# Patient Record
Sex: MALE | Race: OTHER | HISPANIC OR LATINO | Employment: UNEMPLOYED | URBAN - NONMETROPOLITAN AREA
[De-identification: names, ages, dates, MRNs, and addresses within clinical notes are randomized per-mention and may not be internally consistent; named-entity substitution may affect disease eponyms.]

---

## 2024-04-15 ENCOUNTER — OFFICE VISIT (OUTPATIENT)
Dept: URGENT CARE | Facility: CLINIC | Age: 39
End: 2024-04-15
Payer: MEDICAID

## 2024-04-15 VITALS
DIASTOLIC BLOOD PRESSURE: 84 MMHG | OXYGEN SATURATION: 98 % | RESPIRATION RATE: 16 BRPM | BODY MASS INDEX: 35.55 KG/M2 | WEIGHT: 240 LBS | TEMPERATURE: 98.2 F | HEART RATE: 76 BPM | HEIGHT: 69 IN | SYSTOLIC BLOOD PRESSURE: 122 MMHG

## 2024-04-15 DIAGNOSIS — J45.41 MODERATE PERSISTENT ASTHMA WITH EXACERBATION: ICD-10-CM

## 2024-04-15 PROCEDURE — 3079F DIAST BP 80-89 MM HG: CPT | Performed by: FAMILY MEDICINE

## 2024-04-15 PROCEDURE — 3074F SYST BP LT 130 MM HG: CPT | Performed by: FAMILY MEDICINE

## 2024-04-15 PROCEDURE — 99214 OFFICE O/P EST MOD 30 MIN: CPT | Performed by: FAMILY MEDICINE

## 2024-04-15 RX ORDER — ALBUTEROL SULFATE 0.63 MG/3ML
0.63 SOLUTION RESPIRATORY (INHALATION) EVERY 4 HOURS PRN
Qty: 150 ML | Refills: 0 | Status: SHIPPED | OUTPATIENT
Start: 2024-04-15

## 2024-04-15 RX ORDER — ALBUTEROL SULFATE 90 UG/1
2 AEROSOL, METERED RESPIRATORY (INHALATION) EVERY 6 HOURS PRN
Qty: 1 EACH | Refills: 0 | Status: SHIPPED | OUTPATIENT
Start: 2024-04-15

## 2024-04-15 NOTE — PROGRESS NOTES
"Subjective:     Chief Complaint   Patient presents with    Sore Throat     Patient coming for sore throat, fever and body aches x 7 days              Asthma  Pt complains of cough x 7 d. There is no sputum production. The problem occurs constantly. The problem has been unchanged.       Associated symptoms include:   sore throat, body aches,  dyspnea on exertion. Pertinent negatives include no chest pain, fever, nasal congestion, orthopnea, PND, rhinorrhea      Pt's symptoms are aggravated by exercise.     Pt's symptoms are not alleviated by beta-agonist.     Pt's past medical history is significant for asthma.     No past medical history on file.    Social History     Tobacco Use    Smoking status: Never    Smokeless tobacco: Never   Substance Use Topics    Alcohol use: Never    Drug use: Never       No family history on file.    Review of Systems   Constitutional: Negative for fever.   HENT: Negative for rhinorrhea and sore throat.    Respiratory: Positive for cough and shortness of breath. Negative for sputum production.    Cardiovascular: Positive for dyspnea on exertion. Negative for chest pain, palpitations and PND.   All other systems reviewed and are negative.         Objective:     /84   Pulse 76   Temp 36.8 °C (98.2 °F) (Temporal)   Resp 16   Ht 1.753 m (5' 9\")   Wt 109 kg (240 lb)   SpO2 98%       Physical Exam   Constitutional: pt is oriented to person, place, and time. Pt appears well-developed. No distress.   HENT:   Head: Normocephalic and atraumatic.   Right Ear: External ear normal.   Left Ear: External ear normal.   Mouth/Throat: No oropharyngeal exudate.   Eyes: Conjunctivae and EOM are normal. Pupils are equal, round, and reactive to light. No scleral icterus.   Neck: Neck supple. No JVD present.   Cardiovascular: Normal rate, regular rhythm and normal heart sounds.    Pulmonary/Chest: Effort normal. No respiratory distress. Pt has wheezes.   no rales.   Lymphadenopathy:        no " cervical adenopathy.   Neurological:   alert and oriented to person, place, and time.   Skin: Skin is warm and not diaphoretic. No erythema.   Psychiatric: pt behavior is normal.   Nursing note and vitals reviewed.              Assessment/Plan:        1. Moderate persistent asthma with exacerbation   Pt refused oral steroid course  - albuterol 108 (90 Base) MCG/ACT Aero Soln inhalation aerosol; Inhale 2 Puffs every 6 hours as needed for Shortness of Breath.  Dispense: 1 Each; Refill: 0  - albuterol (ACCUNEB) 0.63 MG/3ML nebulizer solution; Take 3 mL by nebulization every four hours as needed for Shortness of Breath.  Dispense: 150 mL; Refill: 0    2. Sore throat  Likely secondary to #1  Pt refused rapid strep test    rx motrin 800mg tid prn      Differential diagnosis, natural history, supportive care, and indications for immediate follow-up discussed. All questions answered. Patient agrees with the plan of care.     Follow-up as needed if symptoms worsen or fail to improve to PCP, Urgent care or Emergency Room.     I have personally reviewed prior external notes and test results pertinent to today's visit.  I have independently reviewed and interpreted all diagnostics ordered during this urgent care acute visit.

## 2024-04-15 NOTE — LETTER
April 15, 2024         Patient: Garth Beverly   YOB: 1985   Date of Visit: 4/15/2024           To Whom it May Concern:    Garth Beverly was seen in my clinic on 4/15/2024. He may return to work on Wednesday.    If you have any questions or concerns, please don't hesitate to call.        Sincerely,           Erasmo Ibarra M.D.  Electronically Signed

## 2025-04-01 ENCOUNTER — OFFICE VISIT (OUTPATIENT)
Dept: URGENT CARE | Facility: CLINIC | Age: 40
End: 2025-04-01
Payer: MEDICAID

## 2025-04-01 VITALS
TEMPERATURE: 97.9 F | BODY MASS INDEX: 36.29 KG/M2 | SYSTOLIC BLOOD PRESSURE: 132 MMHG | OXYGEN SATURATION: 98 % | HEIGHT: 69 IN | RESPIRATION RATE: 16 BRPM | DIASTOLIC BLOOD PRESSURE: 80 MMHG | WEIGHT: 245 LBS | HEART RATE: 60 BPM

## 2025-04-01 DIAGNOSIS — K02.9 DENTAL CARIES: ICD-10-CM

## 2025-04-01 DIAGNOSIS — K08.89 PAIN, DENTAL: ICD-10-CM

## 2025-04-01 PROCEDURE — 3079F DIAST BP 80-89 MM HG: CPT | Performed by: NURSE PRACTITIONER

## 2025-04-01 PROCEDURE — 99213 OFFICE O/P EST LOW 20 MIN: CPT | Performed by: NURSE PRACTITIONER

## 2025-04-01 PROCEDURE — 3075F SYST BP GE 130 - 139MM HG: CPT | Performed by: NURSE PRACTITIONER

## 2025-04-01 RX ORDER — IBUPROFEN 800 MG/1
800 TABLET, FILM COATED ORAL EVERY 8 HOURS PRN
Qty: 30 TABLET | Refills: 0 | Status: SHIPPED | OUTPATIENT
Start: 2025-04-01 | End: 2025-04-22

## 2025-04-01 ASSESSMENT — ENCOUNTER SYMPTOMS
SORE THROAT: 0
FEVER: 0
SHORTNESS OF BREATH: 0
COUGH: 0

## 2025-04-01 NOTE — LETTER
April 1, 2025         Patient: Garth Beverly   YOB: 1985   Date of Visit: 4/1/2025           To Whom it May Concern:    Garth Beverly was seen in my clinic on 4/1/2025. He may return to work on 4/2/2025.    If you have any questions or concerns, please don't hesitate to call.        Sincerely,           MARIUSZ James.  Electronically Signed

## 2025-04-01 NOTE — PROGRESS NOTES
"  Subjective:     Garth Beverly is a 40 y.o. male who presents for Otalgia (Patient coming in for mouth abscess, patient stated he has a tooth pulled out months ago )      Reports mouth pain after eating a chip. Had had a tooth removed, and has had intermittent pain since with eating. Reports new onset pain to tooth proximal to extracted tooth. Has been using Peroxide mouthrinse with some relief.     Dental Pain   This is a recurrent problem. The current episode started in the past 7 days. The pain is at a severity of 5/10. Pertinent negatives include no difficulty swallowing or fever. He has tried NSAIDs for the symptoms.       History reviewed. No pertinent past medical history.    History reviewed. No pertinent surgical history.    Social History     Socioeconomic History    Marital status: Single     Spouse name: Not on file    Number of children: Not on file    Years of education: Not on file    Highest education level: Not on file   Occupational History    Not on file   Tobacco Use    Smoking status: Never    Smokeless tobacco: Never   Substance and Sexual Activity    Alcohol use: Never    Drug use: Never    Sexual activity: Not on file   Other Topics Concern    Not on file   Social History Narrative    Not on file     Social Drivers of Health     Financial Resource Strain: Not on file   Food Insecurity: Not on file   Transportation Needs: Not on file   Physical Activity: Not on file   Stress: Not on file   Social Connections: Not on file   Intimate Partner Violence: Not on file   Housing Stability: Not on file        History reviewed. No pertinent family history.     No Known Allergies    Review of Systems   Constitutional:  Negative for fever.   HENT:  Negative for ear pain and sore throat.    Respiratory:  Negative for cough and shortness of breath.    All other systems reviewed and are negative.       Objective:   /80   Pulse 60   Temp 36.6 °C (97.9 °F)   Resp 16   Ht 1.753 m (5' 9\")   Wt 111 kg " (245 lb)   SpO2 98%   BMI 36.18 kg/m²     Physical Exam  HENT:      Mouth/Throat:      Lips: Pink.      Mouth: Mucous membranes are moist.      Dentition: Dental tenderness and dental caries present.      Pharynx: Oropharynx is clear. No oropharyngeal exudate.     Cardiovascular:      Rate and Rhythm: Normal rate.   Pulmonary:      Effort: Pulmonary effort is normal.   Musculoskeletal:      Cervical back: Neck supple. No edema or crepitus.   Neurological:      Mental Status: He is alert.         Assessment/Plan:   1. Pain, dental  - amoxicillin-clavulanate (AUGMENTIN) 875-125 MG Tab; Take 1 Tablet by mouth 2 times a day for 7 days.  Dispense: 14 Tablet; Refill: 0  - ibuprofen (MOTRIN) 800 MG Tab; Take 1 Tablet by mouth every 8 hours as needed for Inflammation, Moderate Pain or Mild Pain.  Dispense: 30 Tablet; Refill: 0    2. Dental caries  - amoxicillin-clavulanate (AUGMENTIN) 875-125 MG Tab; Take 1 Tablet by mouth 2 times a day for 7 days.  Dispense: 14 Tablet; Refill: 0    -Oral hydration.  -Ice pack for pain and swelling.  -Tylenol and NSAIDs (Ibuprofen) for pain and inflammation.  -Dental Hygiene  -Reduce sugar-rich foods or beverages.     Follow up with dentist. Follow up urgently for worsening symptoms or increased signs of infection (redness, pus, increased pain, increased swelling or fever). Emergently for swelling to neck or throat, difficulty swallowing, difficulty opening mouth, shortness of breath.    -Symptoms are consistent with a dental infection. Clear airway. Initiated antibiotic coverage, and encourage a dental follow up.    Differential diagnosis, natural history, supportive care, and indications for immediate follow-up discussed.

## 2025-04-22 ENCOUNTER — OFFICE VISIT (OUTPATIENT)
Dept: URGENT CARE | Facility: CLINIC | Age: 40
End: 2025-04-22
Payer: MEDICAID

## 2025-04-22 VITALS
SYSTOLIC BLOOD PRESSURE: 122 MMHG | HEIGHT: 69 IN | TEMPERATURE: 98.6 F | HEART RATE: 88 BPM | BODY MASS INDEX: 36.29 KG/M2 | OXYGEN SATURATION: 95 % | DIASTOLIC BLOOD PRESSURE: 66 MMHG | WEIGHT: 245 LBS | RESPIRATION RATE: 16 BRPM

## 2025-04-22 DIAGNOSIS — K04.7 DENTAL INFECTION: ICD-10-CM

## 2025-04-22 PROCEDURE — 99213 OFFICE O/P EST LOW 20 MIN: CPT | Performed by: PHYSICIAN ASSISTANT

## 2025-04-22 PROCEDURE — 3078F DIAST BP <80 MM HG: CPT | Performed by: PHYSICIAN ASSISTANT

## 2025-04-22 PROCEDURE — 3074F SYST BP LT 130 MM HG: CPT | Performed by: PHYSICIAN ASSISTANT

## 2025-04-22 RX ORDER — CLINDAMYCIN HYDROCHLORIDE 300 MG/1
300 CAPSULE ORAL 3 TIMES DAILY
Qty: 21 CAPSULE | Refills: 0 | Status: SHIPPED | OUTPATIENT
Start: 2025-04-22 | End: 2025-04-29

## 2025-04-22 RX ORDER — IBUPROFEN 800 MG/1
800 TABLET, FILM COATED ORAL EVERY 8 HOURS PRN
Qty: 30 TABLET | Refills: 0 | Status: SHIPPED | OUTPATIENT
Start: 2025-04-22

## 2025-04-22 ASSESSMENT — ENCOUNTER SYMPTOMS
EYE DISCHARGE: 0
FEVER: 0
EYE REDNESS: 0

## 2025-04-22 NOTE — PROGRESS NOTES
"Subjective     Garth Beverly is a 40 y.o. male who presents with Other (Second visit for dental pain,  patient stated he has no dental insurance and requesting antibiotics )            Dental Problems   This is a recurrent problem. Episode onset: x 3-4 days ago. The problem has been unchanged. Pertinent negatives include no facial pain, fever or oral bleeding. He has tried acetaminophen for the symptoms.     The patient was recently seen in clinic on 4/1/2025.  The patient was prescribed Augmentin at that time.  The patient states his symptoms improved with the antibiotic, but have returned in the past couple of days.  He was also given a prescription for Motrin 800 mg, which he is no longer taking.    PMH:  has no past medical history on file.  MEDS:   Current Outpatient Medications:     ibuprofen (MOTRIN) 800 MG Tab, Take 1 Tablet by mouth every 8 hours as needed for Inflammation, Moderate Pain or Mild Pain. (Patient not taking: Reported on 4/22/2025), Disp: 30 Tablet, Rfl: 0  ALLERGIES: No Known Allergies  SURGHX: No past surgical history on file.  SOCHX:  reports that he has never smoked. He has never used smokeless tobacco. He reports that he does not drink alcohol and does not use drugs.  FH: Family history was reviewed, no pertinent findings to report      Review of Systems   Constitutional:  Negative for fever.   HENT:  Positive for dental problem.    Eyes:  Negative for discharge and redness.              Objective     /66   Pulse 88   Temp 37 °C (98.6 °F)   Resp 16   Ht 1.753 m (5' 9\")   Wt 111 kg (245 lb)   SpO2 95%   BMI 36.18 kg/m²      Physical Exam  Constitutional:       General: He is not in acute distress.     Appearance: Normal appearance. He is well-developed. He is not ill-appearing.   HENT:      Head: Normocephalic and atraumatic.      Comments:   The patient has mild tenderness overlying the left mandible.  No edema.  No overlying erythema.  No increased warmth.  No palpable " induration.  No palpable fluctuance.     Right Ear: External ear normal.      Left Ear: External ear normal.      Mouth/Throat:      Dentition: Abnormal dentition. Dental tenderness and dental caries present. No gingival swelling or dental abscesses.   Eyes:      Extraocular Movements: Extraocular movements intact.      Conjunctiva/sclera: Conjunctivae normal.   Cardiovascular:      Rate and Rhythm: Normal rate.   Pulmonary:      Effort: Pulmonary effort is normal.   Musculoskeletal:      Cervical back: Normal range of motion and neck supple.   Skin:     General: Skin is warm and dry.   Neurological:      Mental Status: He is alert and oriented to person, place, and time.                                  Assessment & Plan  Dental infection    Orders:    clindamycin (CLEOCIN) 300 MG Cap; Take 1 Capsule by mouth 3 times a day for 7 days.    ibuprofen (MOTRIN) 800 MG Tab; Take 1 Tablet by mouth every 8 hours as needed for Moderate Pain or Inflammation.       Differential diagnoses, supportive care, and indications for immediate follow-up discussed with patient.   Instructed to return to clinic or nearest emergency department for any change in condition, further concerns, or worsening of symptoms.    OTC Tylenol or Motrin for fever/discomfort.  Ice  Drink plenty of fluids  Follow-up with dentist as soon as possible  Return to clinic or go to the ED if symptoms worsen or fail to improve, or if the patient should develop worsening/increasing dental pain, facial swelling, redness or warmth to the affected area, difficulty swallowing, shortness of breath, fever/chills, and/or any concerning symptoms.    Discussed plan with the patient, and he agrees to the above.    I personally reviewed prior external notes and test results pertinent to today's visit.  I have independently reviewed and interpreted all diagnostics ordered during this urgent care visit.     Please note that this dictation was created using voice recognition  software. I have made every reasonable attempt to correct obvious errors, but I expect that there may be errors of grammar and possibly content that I did not discover before finalizing the note.     This note was electronically signed by Kanchan Christianson PA-C

## 2025-04-22 NOTE — LETTER
April 22, 2025         Patient: Garth Beverly   YOB: 1985   Date of Visit: 4/22/2025           To Whom it May Concern:    Garth Beverly was seen in my clinic on 4/22/2025. Please excuse him from work 4/22 an 4/23. He may return to work on 4/24/2025.    If you have any questions or concerns, please don't hesitate to call.        Sincerely,           Kanchan Christianson P.A.-C.  Electronically Signed